# Patient Record
Sex: FEMALE | Race: OTHER | Employment: FULL TIME | URBAN - METROPOLITAN AREA
[De-identification: names, ages, dates, MRNs, and addresses within clinical notes are randomized per-mention and may not be internally consistent; named-entity substitution may affect disease eponyms.]

---

## 2019-07-10 ENCOUNTER — ROUTINE PRENATAL (OUTPATIENT)
Dept: OBGYN CLINIC | Age: 19
End: 2019-07-10

## 2019-07-10 VITALS — WEIGHT: 149 LBS | HEIGHT: 66 IN | BODY MASS INDEX: 23.95 KG/M2

## 2019-07-10 DIAGNOSIS — Z36.9 FIRST TRIMESTER SCREENING: Primary | ICD-10-CM

## 2019-07-10 DIAGNOSIS — Z3A.14 14 WEEKS GESTATION OF PREGNANCY: ICD-10-CM

## 2019-07-10 NOTE — PROGRESS NOTES
New OB, Ultrasound c/w dating of 14 weeks today w/an DESIREE of 1/7/2020. Otherwise interested in Quad screening- Will f/u in 4 weeks. Will order quad screening  For that time. Taking PNV. No complaints today.

## 2019-07-24 LAB
HCT, EXTERNAL: 37.8
HEPATITIS C AB,   EXT: NEGATIVE
HGB EVAL, EXTERNAL: NEGATIVE
HGB, EXTERNAL: 12.7
PLATELET CNT,   EXTERNAL: 288
RPR, EXTERNAL: NORMAL
RUBELLA, EXTERNAL: POSITIVE
URINALYSIS, EXTERNAL: NEGATIVE

## 2019-08-07 ENCOUNTER — ROUTINE PRENATAL (OUTPATIENT)
Dept: OBGYN CLINIC | Age: 19
End: 2019-08-07

## 2019-08-07 ENCOUNTER — HOSPITAL ENCOUNTER (OUTPATIENT)
Dept: LAB | Age: 19
Discharge: HOME | End: 2019-08-07
Payer: OTHER GOVERNMENT

## 2019-08-07 VITALS
WEIGHT: 150 LBS | DIASTOLIC BLOOD PRESSURE: 76 MMHG | HEIGHT: 66 IN | SYSTOLIC BLOOD PRESSURE: 117 MMHG | TEMPERATURE: 96.3 F | HEART RATE: 89 BPM | BODY MASS INDEX: 24.11 KG/M2

## 2019-08-07 DIAGNOSIS — Z3A.18 18 WEEKS GESTATION OF PREGNANCY: Primary | ICD-10-CM

## 2019-08-07 DIAGNOSIS — Z3A.18 18 WEEKS GESTATION OF PREGNANCY: ICD-10-CM

## 2019-08-07 PROCEDURE — 36415 COLL VENOUS BLD VENIPUNCTURE: CPT

## 2019-08-07 PROCEDURE — 82677 ASSAY OF ESTRIOL: CPT

## 2019-08-07 NOTE — PROGRESS NOTES
Geovany  presents today for   Chief Complaint   Patient presents with    Initial Prenatal Visit       Is someone accompanying this pt? FAMILY     /76   Pulse 89   Temp 96.3 °F (35.7 °C) (Oral)   Ht 5' 6\" (1.676 m)   Wt 150 lb (68 kg)   BMI 24.21 kg/m²     Depression Screening:  No flowsheet data found. Learning Assessment:  No flowsheet data found. Abuse Screening:  No flowsheet data found. Fall Risk  No flowsheet data found. This Visit Test  No results found for this or any previous visit. Health Maintenance reviewed and discussed and ordered per Provider. Health Maintenance Due   Topic Date Due    HPV Age 9Y-34Y (1 - Female 3-dose series) 07/23/2013    PAP AKA CERVICAL CYTOLOGY  07/23/2019    Influenza Age 9 to Adult  08/01/2019   . Coordination of Care:  1. Have you been to the ER, urgent care clinic since your last visit? Hospitalized since your last visit? NO    2. Have you seen or consulted any other health care providers outside of the [address] [place] [address] since your last visit? Include any pap smears or colon screening. NO    Patients last menstrual period was     Patient is at 25   Week (s).   The following has been completed/ordered:

## 2019-08-10 LAB
2ND TRIMESTER 4 SCREEN SERPL-IMP: NORMAL
2ND TRIMESTER 4 SCREEN SERPL-IMP: NORMAL
AFP ADJ MOM SERPL: 1.55
AFP SERPL-MCNC: 65.3 NG/ML
AGE AT DELIVERY: 21.4 YR
COMMENTS, 018014: NORMAL
FET TS 18 RISK FROM MAT AGE: NORMAL
FET TS 21 RISK FROM MAT AGE: 1141
GA METHOD: NORMAL
GA: 18 WEEKS
HCG ADJ MOM SERPL: 1.61
HCG SERPL-ACNC: NORMAL MIU/ML
IDDM PATIENT QL: NO
INHIBIN A ADJ MOM SERPL: 1.09
INHIBIN A SERPL-MCNC: 188.34 PG/ML
MULTIPLE PREGNANCY: NORMAL
NEURAL TUBE DEFECT RISK FETUS: 2393 %
RESULTS, 017389: NORMAL
TS 18 RISK FETUS: NORMAL
TS 21 RISK FETUS: 8693
U ESTRIOL ADJ MOM SERPL: 0.84
U ESTRIOL SERPL-MCNC: 1.08 NG/ML

## 2019-08-27 ENCOUNTER — ROUTINE PRENATAL (OUTPATIENT)
Dept: OBGYN CLINIC | Age: 19
End: 2019-08-27

## 2019-08-27 VITALS
SYSTOLIC BLOOD PRESSURE: 120 MMHG | WEIGHT: 153.4 LBS | TEMPERATURE: 97.9 F | BODY MASS INDEX: 24.65 KG/M2 | HEART RATE: 80 BPM | DIASTOLIC BLOOD PRESSURE: 77 MMHG | HEIGHT: 66 IN

## 2019-08-27 DIAGNOSIS — R10.2 PELVIC PAIN: Primary | ICD-10-CM

## 2019-08-27 DIAGNOSIS — Z3A.19 19 WEEKS GESTATION OF PREGNANCY: ICD-10-CM

## 2019-09-30 ENCOUNTER — ROUTINE PRENATAL (OUTPATIENT)
Dept: OBGYN CLINIC | Age: 19
End: 2019-09-30

## 2019-09-30 VITALS
DIASTOLIC BLOOD PRESSURE: 69 MMHG | RESPIRATION RATE: 18 BRPM | HEART RATE: 103 BPM | SYSTOLIC BLOOD PRESSURE: 125 MMHG | HEIGHT: 66 IN | WEIGHT: 157 LBS | BODY MASS INDEX: 25.23 KG/M2

## 2019-09-30 DIAGNOSIS — Z3A.26 26 WEEKS GESTATION OF PREGNANCY: Primary | ICD-10-CM

## 2019-10-02 NOTE — PATIENT INSTRUCTIONS

## 2019-10-07 ENCOUNTER — HOSPITAL ENCOUNTER (OUTPATIENT)
Dept: LAB | Age: 19
Discharge: HOME | End: 2019-10-07
Payer: OTHER GOVERNMENT

## 2019-10-07 ENCOUNTER — OFFICE VISIT (OUTPATIENT)
Dept: OBGYN CLINIC | Age: 19
End: 2019-10-07

## 2019-10-07 DIAGNOSIS — Z3A.26 26 WEEKS GESTATION OF PREGNANCY: Primary | ICD-10-CM

## 2019-10-07 DIAGNOSIS — Z3A.26 26 WEEKS GESTATION OF PREGNANCY: ICD-10-CM

## 2019-10-07 LAB
BASOPHILS # BLD: 0 K/UL (ref 0–0.1)
BASOPHILS NFR BLD: 0 % (ref 0–2)
DIFFERENTIAL METHOD BLD: ABNORMAL
EOSINOPHIL # BLD: 0.1 K/UL (ref 0–0.4)
EOSINOPHIL NFR BLD: 1 % (ref 0–5)
ERYTHROCYTE [DISTWIDTH] IN BLOOD BY AUTOMATED COUNT: 12.8 % (ref 11.6–14.5)
GLUCOSE 1H P 100 G GLC PO SERPL-MCNC: 123 MG/DL (ref 60–140)
HCT VFR BLD AUTO: 34 % (ref 35–45)
HGB BLD-MCNC: 11.1 G/DL (ref 12–16)
LYMPHOCYTES # BLD: 1.6 K/UL (ref 0.9–3.6)
LYMPHOCYTES NFR BLD: 20 % (ref 21–52)
MCH RBC QN AUTO: 32.4 PG (ref 24–34)
MCHC RBC AUTO-ENTMCNC: 32.6 G/DL (ref 31–37)
MCV RBC AUTO: 99.1 FL (ref 74–97)
MONOCYTES # BLD: 0.4 K/UL (ref 0.05–1.2)
MONOCYTES NFR BLD: 5 % (ref 3–10)
NEUTS SEG # BLD: 5.6 K/UL (ref 1.8–8)
NEUTS SEG NFR BLD: 74 % (ref 40–73)
PLATELET # BLD AUTO: 296 K/UL (ref 135–420)
PMV BLD AUTO: 9.5 FL (ref 9.2–11.8)
RBC # BLD AUTO: 3.43 M/UL (ref 4.2–5.3)
WBC # BLD AUTO: 7.7 K/UL (ref 4.6–13.2)

## 2019-10-07 PROCEDURE — 85025 COMPLETE CBC W/AUTO DIFF WBC: CPT

## 2019-10-07 PROCEDURE — 36415 COLL VENOUS BLD VENIPUNCTURE: CPT

## 2019-10-07 PROCEDURE — 82950 GLUCOSE TEST: CPT

## 2019-10-25 ENCOUNTER — ROUTINE PRENATAL (OUTPATIENT)
Dept: OBGYN CLINIC | Age: 19
End: 2019-10-25

## 2019-10-25 VITALS
DIASTOLIC BLOOD PRESSURE: 59 MMHG | HEART RATE: 88 BPM | HEIGHT: 66 IN | BODY MASS INDEX: 25.55 KG/M2 | SYSTOLIC BLOOD PRESSURE: 114 MMHG | WEIGHT: 159 LBS

## 2019-10-25 DIAGNOSIS — H65.01 NON-RECURRENT ACUTE SEROUS OTITIS MEDIA OF RIGHT EAR: ICD-10-CM

## 2019-10-25 DIAGNOSIS — H65.00 NON-RECURRENT ACUTE SEROUS OTITIS MEDIA, UNSPECIFIED LATERALITY: ICD-10-CM

## 2019-10-25 DIAGNOSIS — Z3A.29 29 WEEKS GESTATION OF PREGNANCY: Primary | ICD-10-CM

## 2019-10-25 DIAGNOSIS — B37.9 YEAST DETECTED: ICD-10-CM

## 2019-10-25 RX ORDER — AMOXICILLIN 500 MG/1
500 CAPSULE ORAL 2 TIMES DAILY
Qty: 14 CAP | Refills: 0 | Status: SHIPPED | OUTPATIENT
Start: 2019-10-25 | End: 2019-11-01

## 2019-10-25 RX ORDER — FLUCONAZOLE 150 MG/1
150 TABLET ORAL DAILY
Qty: 1 TAB | Refills: 0 | Status: SHIPPED | OUTPATIENT
Start: 2019-10-25 | End: 2019-10-26

## 2019-10-25 NOTE — PROGRESS NOTES
Chief Complaint   Patient presents with    Routine Prenatal Visit    Ear Pain     Room 13  Visit Vitals  /59   Pulse 88   Ht 5' 6\" (1.676 m)   Wt 159 lb (72.1 kg)   BMI 25.66 kg/m²     Results for orders placed or performed during the hospital encounter of 10/07/19   CBC WITH AUTOMATED DIFF   Result Value Ref Range    WBC 7.7 4.6 - 13.2 K/uL    RBC 3.43 (L) 4.20 - 5.30 M/uL    HGB 11.1 (L) 12.0 - 16.0 g/dL    HCT 34.0 (L) 35.0 - 45.0 %    MCV 99.1 (H) 74.0 - 97.0 FL    MCH 32.4 24.0 - 34.0 PG    MCHC 32.6 31.0 - 37.0 g/dL    RDW 12.8 11.6 - 14.5 %    PLATELET 400 734 - 917 K/uL    MPV 9.5 9.2 - 11.8 FL    NEUTROPHILS 74 (H) 40 - 73 %    LYMPHOCYTES 20 (L) 21 - 52 %    MONOCYTES 5 3 - 10 %    EOSINOPHILS 1 0 - 5 %    BASOPHILS 0 0 - 2 %    ABS. NEUTROPHILS 5.6 1.8 - 8.0 K/UL    ABS. LYMPHOCYTES 1.6 0.9 - 3.6 K/UL    ABS. MONOCYTES 0.4 0.05 - 1.2 K/UL    ABS. EOSINOPHILS 0.1 0.0 - 0.4 K/UL    ABS.  BASOPHILS 0.0 0.0 - 0.1 K/UL    DF AUTOMATED     GLUCOSE, GESTATIONAL 1 HR TOLERANCE   Result Value Ref Range    GESTATIONAL GLUC 1HR 123 60 - 140 MG/DL

## 2019-10-25 NOTE — PROGRESS NOTES
Passed татьяна, Will think about immunizations for next visit.  Has left ear infection- Will give Amoxicillin

## 2019-10-25 NOTE — PATIENT INSTRUCTIONS

## 2019-11-15 ENCOUNTER — ROUTINE PRENATAL (OUTPATIENT)
Dept: OBGYN CLINIC | Age: 19
End: 2019-11-15

## 2019-11-15 VITALS
HEART RATE: 93 BPM | DIASTOLIC BLOOD PRESSURE: 55 MMHG | SYSTOLIC BLOOD PRESSURE: 103 MMHG | RESPIRATION RATE: 18 BRPM | BODY MASS INDEX: 26.2 KG/M2 | HEIGHT: 66 IN | TEMPERATURE: 100.3 F | WEIGHT: 163 LBS

## 2019-11-15 DIAGNOSIS — Z3A.32 32 WEEKS GESTATION OF PREGNANCY: Primary | ICD-10-CM

## 2019-11-15 NOTE — PROGRESS NOTES
Lauren Hodges presents today for   Chief Complaint   Patient presents with    Routine Prenatal Visit       Is someone accompanying this pt? Partner   aNo flowsheet data found. Learning Assessment:  No flowsheet data found. Abuse Screening:  No flowsheet data found. Fall Risk  No flowsheet data found. This Visit Test  Results for orders placed or performed during the hospital encounter of 10/07/19   CBC WITH AUTOMATED DIFF   Result Value Ref Range    WBC 7.7 4.6 - 13.2 K/uL    RBC 3.43 (L) 4.20 - 5.30 M/uL    HGB 11.1 (L) 12.0 - 16.0 g/dL    HCT 34.0 (L) 35.0 - 45.0 %    MCV 99.1 (H) 74.0 - 97.0 FL    MCH 32.4 24.0 - 34.0 PG    MCHC 32.6 31.0 - 37.0 g/dL    RDW 12.8 11.6 - 14.5 %    PLATELET 329 019 - 160 K/uL    MPV 9.5 9.2 - 11.8 FL    NEUTROPHILS 74 (H) 40 - 73 %    LYMPHOCYTES 20 (L) 21 - 52 %    MONOCYTES 5 3 - 10 %    EOSINOPHILS 1 0 - 5 %    BASOPHILS 0 0 - 2 %    ABS. NEUTROPHILS 5.6 1.8 - 8.0 K/UL    ABS. LYMPHOCYTES 1.6 0.9 - 3.6 K/UL    ABS. MONOCYTES 0.4 0.05 - 1.2 K/UL    ABS. EOSINOPHILS 0.1 0.0 - 0.4 K/UL    ABS. BASOPHILS 0.0 0.0 - 0.1 K/UL    DF AUTOMATED     GLUCOSE, GESTATIONAL 1 HR TOLERANCE   Result Value Ref Range    GESTATIONAL GLUC 1HR 123 60 - 140 MG/DL       Health Maintenance reviewed and discussed and ordered per Provider. Health Maintenance Due   Topic Date Due    HPV Age 9Y-34Y (1 - Female 3-dose series) 07/23/2013    PAP AKA CERVICAL CYTOLOGY  07/23/2019    Influenza Age 9 to Adult  08/01/2019    OB 3RD TRIMESTER TDAP  10/09/2019   . Coordination of Care:  1. Have you been to the ER, urgent care clinic since your last visit? Hospitalized since your last visit? no    2. Have you seen or consulted any other health care providers outside of the [address] [place] [address] since your last visit? Include any pap smears or colon screening.  No

## 2019-11-29 NOTE — PROGRESS NOTES
Pt. Doing well, no complaints. +FM  Will rediscuss immunizations at next visit.    F/U 2 weeks routine ob

## 2019-12-04 ENCOUNTER — ROUTINE PRENATAL (OUTPATIENT)
Dept: OBGYN CLINIC | Age: 19
End: 2019-12-04

## 2019-12-04 ENCOUNTER — CLINICAL SUPPORT (OUTPATIENT)
Dept: OBGYN CLINIC | Age: 19
End: 2019-12-04

## 2019-12-04 VITALS — OXYGEN SATURATION: 99 % | RESPIRATION RATE: 20 BRPM | WEIGHT: 168.4 LBS | BODY MASS INDEX: 27.06 KG/M2 | HEIGHT: 66 IN

## 2019-12-04 DIAGNOSIS — Z3A.35 35 WEEKS GESTATION OF PREGNANCY: Primary | ICD-10-CM

## 2019-12-04 DIAGNOSIS — Z34.90 PREGNANCY, UNSPECIFIED GESTATIONAL AGE: Primary | ICD-10-CM

## 2019-12-04 NOTE — PROGRESS NOTES
Ms. Elisabeth Muñiz is a   35w0d with Estimated Date of Delivery: 20 who presents to the office for a routine prenatal visit. She denies contractions, leakage of fluid, or vaginal bleeding. She reports normal fetal movement. There were no vitals taken for this visit. A/P  22 y.o. with an IUP at 35w0d here for a routine prenatal visit. 1. Continue routine prenatal care  2. Strict 3rd trimester precautions given. Advised regarding leakage of fluid, contractions, and vaginal bleeding. Fetal kick count instructions given. 3. F/U in 1 weeks with 3rd trimester labs   4. Normal growth scan today. Result Narrative     [address]  [address] [facility] [address]  [place], [address] [place] [address]  [phone]  Patient Information    2019  Elisabeth Muñiz 2000     22 y.o. MR# [id number]  unknown    Indication:  Interval growth     Machine Used: [employer] HROG or [employer] 3 expert  Probe type: abdominal      Findings  Number of Fetuses: 1   Presentation: Vertex  Placental Location: Posterior   Cardiac Rate and Rhythm: 144 bpm   Amniotic Fluid Volume:  Largest pocket 5.1 cm  Cervical length:  3.0 cm    BPD: 8 cm (35w6d)  HC: 31 cm (35w4d)  AC: 30 cm (34w5d)  FL: 6 cm (35w6d)    GA by US: 35 Wks: 3 Days   Estimated Fetal Wt: 2628 g +/- 384 g, 53 percentile    Impression:  Estimated fetal weight and amniotic fluid appropriate for gestational age.     Sonographer: Marycarmen Nguyen   Physician: Dr. Ranjan Camarillo

## 2019-12-04 NOTE — PATIENT INSTRUCTIONS

## 2019-12-13 ENCOUNTER — ROUTINE PRENATAL (OUTPATIENT)
Dept: OBGYN CLINIC | Age: 19
End: 2019-12-13

## 2019-12-13 ENCOUNTER — HOSPITAL ENCOUNTER (OUTPATIENT)
Dept: LAB | Age: 19
Discharge: HOME | End: 2019-12-13
Payer: OTHER GOVERNMENT

## 2019-12-13 VITALS
HEIGHT: 66 IN | WEIGHT: 169 LBS | DIASTOLIC BLOOD PRESSURE: 71 MMHG | HEART RATE: 82 BPM | BODY MASS INDEX: 27.16 KG/M2 | TEMPERATURE: 98.1 F | SYSTOLIC BLOOD PRESSURE: 106 MMHG | RESPIRATION RATE: 18 BRPM

## 2019-12-13 DIAGNOSIS — Z3A.36 36 WEEKS GESTATION OF PREGNANCY: Primary | ICD-10-CM

## 2019-12-13 DIAGNOSIS — Z3A.36 36 WEEKS GESTATION OF PREGNANCY: ICD-10-CM

## 2019-12-13 PROCEDURE — 87491 CHLMYD TRACH DNA AMP PROBE: CPT

## 2019-12-16 LAB
C TRACH RRNA SPEC QL NAA+PROBE: NEGATIVE
N GONORRHOEA RRNA SPEC QL NAA+PROBE: NEGATIVE
SPECIMEN SOURCE: NORMAL
T VAGINALIS RRNA SPEC QL NAA+PROBE: NEGATIVE

## 2019-12-19 ENCOUNTER — ROUTINE PRENATAL (OUTPATIENT)
Dept: OBGYN CLINIC | Age: 19
End: 2019-12-19

## 2019-12-19 ENCOUNTER — HOSPITAL ENCOUNTER (OUTPATIENT)
Dept: LAB | Age: 19
Discharge: HOME | End: 2019-12-19
Payer: OTHER GOVERNMENT

## 2019-12-19 VITALS
BODY MASS INDEX: 27.16 KG/M2 | HEART RATE: 90 BPM | HEIGHT: 66 IN | DIASTOLIC BLOOD PRESSURE: 80 MMHG | SYSTOLIC BLOOD PRESSURE: 119 MMHG | RESPIRATION RATE: 20 BRPM | OXYGEN SATURATION: 99 % | WEIGHT: 169 LBS

## 2019-12-19 DIAGNOSIS — Z3A.37 37 WEEKS GESTATION OF PREGNANCY: Primary | ICD-10-CM

## 2019-12-19 DIAGNOSIS — Z3A.37 37 WEEKS GESTATION OF PREGNANCY: ICD-10-CM

## 2019-12-19 LAB
ABO + RH BLD: NORMAL
BLOOD GROUP ANTIBODIES SERPL: NORMAL
SPECIMEN EXP DATE BLD: NORMAL

## 2019-12-19 PROCEDURE — 86900 BLOOD TYPING SEROLOGIC ABO: CPT

## 2019-12-19 NOTE — PROGRESS NOTES
Ms. Lyudmila Villegas is a   37w1d with Estimated Date of Delivery: 20 who presents to the office for a routine prenatal visit. She denies contractions, leakage of fluid, or vaginal bleeding. She reports normal fetal movement. Visit Vitals  /80   Pulse 90   Resp 20   Ht 5' 6\" (1.676 m)   Wt 169 lb (76.7 kg)   SpO2 99%   BMI 27.28 kg/m²       A/P  22 y.o. with an IUP at 37w1d here for a routine prenatal visit. 1. Continue routine prenatal care  2. Strict 3rd trimester precautions given. Advised regarding leakage of fluid, contractions, and vaginal bleeding. Fetal kick count instructions given.   3. F/U in 1 weeks

## 2019-12-19 NOTE — PROGRESS NOTES
Eliudstas Eliot presents today for   Chief Complaint   Patient presents with    Routine Prenatal Visit       Is someone accompanying this pt? yes  atient does not have any concerns at this time. Patient no birth control at this time . Depression screening     Is the patient using any DME equipment during OV? no    Depression Screening:  No flowsheet data found. Learning Assessment:  No flowsheet data found. Abuse Screening:  No flowsheet data found. Fall Risk  No flowsheet data found. This Visit Test  Results for orders placed or performed during the hospital encounter of 12/13/19   CHLAMYDIA/NEISSERIA/TRICHOMONAS AMP   Result Value Ref Range    Chlamydia amplification NEGATIVE  NEG      N. gonorrhoeae amplification NEGATIVE  NEG      Trichomonas amplification NEGATIVE  NEG      Specimen Source ENDOCERVICAL         Health Maintenance reviewed and discussed and ordered per Provider. Health Maintenance Due   Topic Date Due    HPV Age 9Y-34Y (1 - Female 2-dose series) 07/23/2009    PAP AKA CERVICAL CYTOLOGY  07/23/2019   . Coordination of Care:  1. Have you been to the ER, urgent care clinic since your last visit? Hospitalized since your last visit? no    2. Have you seen or consulted any other health care providers outside of the [address] [place] [address] since your last visit? Include any pap smears or colon screening.  no

## 2019-12-27 ENCOUNTER — ROUTINE PRENATAL (OUTPATIENT)
Dept: OBGYN CLINIC | Age: 19
End: 2019-12-27

## 2019-12-27 VITALS
BODY MASS INDEX: 27.9 KG/M2 | RESPIRATION RATE: 20 BRPM | HEART RATE: 86 BPM | WEIGHT: 173.6 LBS | OXYGEN SATURATION: 99 % | HEIGHT: 66 IN | SYSTOLIC BLOOD PRESSURE: 105 MMHG | DIASTOLIC BLOOD PRESSURE: 75 MMHG

## 2019-12-27 DIAGNOSIS — Z3A.38 38 WEEKS GESTATION OF PREGNANCY: Primary | ICD-10-CM

## 2019-12-27 LAB
BILIRUB UR QL STRIP: NEGATIVE
GLUCOSE UR-MCNC: NEGATIVE MG/DL
KETONES P FAST UR STRIP-MCNC: NEGATIVE MG/DL
PH UR STRIP: 7 [PH] (ref 4.6–8)
PROT UR QL STRIP: NEGATIVE
SP GR UR STRIP: 1.02 (ref 1–1.03)
UA UROBILINOGEN AMB POC: NORMAL (ref 0.2–1)
URINALYSIS CLARITY POC: CLEAR
URINALYSIS COLOR POC: YELLOW
URINE BLOOD POC: NEGATIVE
URINE LEUKOCYTES POC: NORMAL
URINE NITRITES POC: NEGATIVE

## 2019-12-27 NOTE — PROGRESS NOTES
38. 2 WGA- Routine ob; +FM  Plan:  1) Decreased FH- needs growth scan before provider visit.   2) F/U one week routine ob

## 2019-12-27 NOTE — PROGRESS NOTES
Kenny Hein presents today for   Chief Complaint   Patient presents with    Routine Prenatal Visit       Is someone accompanying this pt? yes  atient does not have any concerns at this time. Patient no birth control at this time . Depression screening     Is the patient using any DME equipment during OV? no    Depression Screening:  No flowsheet data found. Learning Assessment:  No flowsheet data found. Abuse Screening:  No flowsheet data found. Fall Risk  No flowsheet data found. This Visit Test  Results for orders placed or performed during the hospital encounter of 12/19/19   TYPE & SCREEN   Result Value Ref Range    Crossmatch Expiration 12/22/2019     ABO/Rh(D) A POSITIVE     Antibody screen NEG        Health Maintenance reviewed and discussed and ordered per Provider. Health Maintenance Due   Topic Date Due    HPV Age 9Y-34Y (1 - Female 2-dose series) 07/23/2009    PAP AKA CERVICAL CYTOLOGY  07/23/2019   . Coordination of Care:  1. Have you been to the ER, urgent care clinic since your last visit? Hospitalized since your last visit? no    2. Have you seen or consulted any other health care providers outside of the [address] [place] [address] since your last visit? Include any pap smears or colon screening.  no

## 2019-12-27 NOTE — PATIENT INSTRUCTIONS
Week 39 of Your Pregnancy: Care Instructions  Your Care Instructions    During these final weeks, you may feel anxious to see your new baby. [place] babies often look different from what you see in pictures or movies. Right after birth, their heads may have a strange shape. Their eyes may be puffy. And their genitals may be swollen. They may also have very dry skin, or red marks on the eyelids, nose, or neck. Still, most parents think their babies are beautiful. Follow-up care is a key part of your treatment and safety. Be sure to make and go to all appointments, and call your doctor if you are having problems. It's also a good idea to know your test results and keep a list of the medicines you take. How can you care for yourself at home? Prepare to breastfeed  · If you are breastfeeding, continue to eat healthy foods. · If your health care provider recommends it, keep taking your prenatal vitamins. · Talk to your doctor before you take any medicine or supplement. That's because some medicines can affect your breast milk. · You can help prevent sore nipples if you feed your baby in the correct position. Nurses will help you learn to do this. · Your  will need to be fed about every 1 to 3 hours. Choose the right birth control after your baby is born  · Women who are breastfeeding can still get pregnant. Use birth control if you don't want to get pregnant. · Intrauterine devices (IUDs) are very effective at preventing pregnancy and can provide birth control for 3 to 10 years, depending on the type. If you talk with your doctor before having your baby, the IUD can be placed right after you give birth. If you decide you want to get pregnant later, you can have it removed. They are safe to use while you are breastfeeding. · A hormonal implant is also very effective at preventing pregnancy. It is placed under the skin of the arm. This can be done right after you give birth.  It releases the hormone progestin and prevents pregnancy for about 3 years. This can also be removed by a doctor at any time. It is safe to use while you are breastfeeding. · Depo-Provera can be used while you are breastfeeding. It is a shot you get every 3 months. · Birth control pills work well. But you need a different kind of pill for the first few weeks after giving birth. And when you start taking these pills, you need to make sure to use another type of birth control for 7 days after you start your pack. · Diaphragms, cervical caps, and condoms with spermicide work less well after birth. If you have a diaphragm or cervical cap, talk to your doctor to see if you need a different size. · Tubal ligation (tying your tubes) and vasectomy are both permanent. These are good options if you are sure you are done having children. Where can you learn more? Go to http://doreen-ok.info/. Enter T959 in the search box to learn more about \"Week 39 of Your Pregnancy: Care Instructions. \"  Current as of: May 29, 2019  Content Version: 12.2  © 2693-9970 [employer]. Care instructions adapted under license by [employer] (which disclaims liability or warranty for this information). If you have questions about a medical condition or this instruction, always ask your healthcare professional. [place] [address] any warranty or liability for your use of this information. Week 38 of Your Pregnancy: Care Instructions  Your Care Instructions    Believe it or not, your baby is almost here. You may have ideas about your baby's personality because of how much he or she moves. Or you may have noticed how he or she responds to sounds, warmth, cold, and light. You may even know what kind of music your baby likes. By now, you have a better idea of what to expect during delivery. You may have talked about your birth preferences with your doctor.  But even if you want a vaginal birth, it is a good idea to learn about  births.  birth means that your baby is born through a cut (incision) in your lower belly. It is sometimes the best choice for the health of the baby and the mother. This care sheet can help you understand  births. It also gives you information about what to expect after your baby is born. And it helps you understand more about postpartum depression. Follow-up care is a key part of your treatment and safety. Be sure to make and go to all appointments, and call your doctor if you are having problems. It's also a good idea to know your test results and keep a list of the medicines you take. How can you care for yourself at home? Learn about  birth  · Most C-sections are unplanned. They are done because of problems that occur during labor. These problems might include:  ? Labor that slows or stops. ? High blood pressure or other problems for the mother. ? Signs of distress in the baby. These signs may include a very fast or slow heart rate. · Although most mothers and babies do well after , it is major surgery. It has more risks than a vaginal delivery. · In some cases, a planned  may be safer than a vaginal delivery. This may be the case if:  ? The mother has a health problem, such as a heart condition. ? The baby isn't in a head-down position for delivery. This is called a breech position. ? The uterus has scars from past surgeries. This could increase the chance of a tear in the uterus. ? There is a problem with the placenta. ? The mother has an infection, such as genital herpes, that could be spread to the baby. ? The mother is having twins or more. ? The baby weighs 9 to 10 pounds or more. · Because of the risks of , planned C-sections generally should be done only for medical reasons. And a planned  should be done at 39 weeks or later unless there is a medical reason to do it sooner.   Know what to expect after delivery, and plan for the first few weeks at home  · You, your baby, and your partner or  will get identification bands. Only people with matching bands can  the baby from the nursery. · You will learn how to feed, diaper, and bathe your baby. And you will learn how to care for the umbilical cord stump. If your baby will be circumcised, you will also learn how to care for that. · Ask people to wait to visit you until you are at home. And ask them to wash their hands before they touch your baby. · Make sure you have another adult in your home for at least 2 or 3 days after the birth. · During the first 2 weeks, limit when friends and family can visit. · Do not allow visitors who have colds or infections. Make sure all visitors are up to date with their vaccinations. Never let anyone smoke around your baby. · Try to nap when the baby naps. Be aware of postpartum depression  · \"Baby blues\" are common for the first 1 to 2 weeks after birth. You may cry or feel sad or irritable for no reason. · For some women, these feelings last longer and are more intense. This is called postpartum depression. · If your symptoms last for more than a few weeks or you feel very depressed, ask your doctor for help. · Postpartum depression can be treated. Support groups and counseling can help. Sometimes medicine can also help. Where can you learn more? Go to http://doreen-ok.info/. Enter B044 in the search box to learn more about \"Week 38 of Your Pregnancy: Care Instructions. \"  Current as of: May 29, 2019  Content Version: 12.2  © 3082-1433 [employer]. Care instructions adapted under license by [employer] (which disclaims liability or warranty for this information).  If you have questions about a medical condition or this instruction, always ask your healthcare professional. [address] any warranty or liability for your use of this information.

## 2020-01-06 ENCOUNTER — HOSPITAL ENCOUNTER (INPATIENT)
Age: 20
LOS: 3 days | Discharge: HOME | End: 2020-01-09
Attending: OBSTETRICS & GYNECOLOGY | Admitting: OBSTETRICS & GYNECOLOGY
Payer: OTHER GOVERNMENT

## 2020-01-06 ENCOUNTER — ROUTINE PRENATAL (OUTPATIENT)
Dept: OBGYN CLINIC | Age: 20
End: 2020-01-06

## 2020-01-06 ENCOUNTER — HOSPITAL ENCOUNTER (OUTPATIENT)
Dept: LAB | Age: 20
Discharge: HOME | End: 2020-01-06
Payer: OTHER GOVERNMENT

## 2020-01-06 VITALS
SYSTOLIC BLOOD PRESSURE: 112 MMHG | RESPIRATION RATE: 20 BRPM | WEIGHT: 173.2 LBS | HEART RATE: 86 BPM | BODY MASS INDEX: 27.83 KG/M2 | OXYGEN SATURATION: 99 % | HEIGHT: 66 IN | DIASTOLIC BLOOD PRESSURE: 78 MMHG

## 2020-01-06 DIAGNOSIS — Z3A.39 39 WEEKS GESTATION OF PREGNANCY: Primary | ICD-10-CM

## 2020-01-06 DIAGNOSIS — Z3A.39 39 WEEKS GESTATION OF PREGNANCY: ICD-10-CM

## 2020-01-06 PROBLEM — O26.90 ABNORMAL PREGNANCY: Status: ACTIVE | Noted: 2020-01-06

## 2020-01-06 LAB
ABO + RH BLD: NORMAL
BLOOD GROUP ANTIBODIES SERPL: NORMAL
ERYTHROCYTE [DISTWIDTH] IN BLOOD BY AUTOMATED COUNT: 12.1 % (ref 11.6–14.5)
HCT VFR BLD AUTO: 35.5 % (ref 35–45)
HGB BLD-MCNC: 11.6 G/DL (ref 12–16)
MCH RBC QN AUTO: 31.6 PG (ref 24–34)
MCHC RBC AUTO-ENTMCNC: 32.7 G/DL (ref 31–37)
MCV RBC AUTO: 96.7 FL (ref 74–97)
PLATELET # BLD AUTO: 305 K/UL (ref 135–420)
PMV BLD AUTO: 9.9 FL (ref 9.2–11.8)
RBC # BLD AUTO: 3.67 M/UL (ref 4.2–5.3)
SPECIMEN EXP DATE BLD: NORMAL
WBC # BLD AUTO: 10.3 K/UL (ref 4.6–13.2)

## 2020-01-06 PROCEDURE — 85027 COMPLETE CBC AUTOMATED: CPT

## 2020-01-06 PROCEDURE — 65270000029 HC RM PRIVATE

## 2020-01-06 PROCEDURE — 87081 CULTURE SCREEN ONLY: CPT

## 2020-01-06 PROCEDURE — 86900 BLOOD TYPING SEROLOGIC ABO: CPT

## 2020-01-06 NOTE — PROGRESS NOTES
Chuy Mcgrath presents today for   Chief Complaint   Patient presents with    Routine Prenatal Visit     c/o dizzy spells       Is someone accompanying this pt? yes  atient does not have any concerns at this time. Patient no birth control at this time . Depression screening     Is the patient using any DME equipment during OV? no    Depression Screening:  No flowsheet data found. Learning Assessment:  No flowsheet data found. Abuse Screening:  No flowsheet data found. Fall Risk  No flowsheet data found. This Visit Test  Results for orders placed or performed in visit on 12/27/19   AMB POC URINALYSIS DIP STICK AUTO W/ MICRO   Result Value Ref Range    Color (UA POC) Yellow     Clarity (UA POC) Clear     Glucose (UA POC) Negative Negative    Bilirubin (UA POC) Negative Negative    Ketones (UA POC) Negative Negative    Specific gravity (UA POC) 1.020 1.001 - 1.035    Blood (UA POC) Negative Negative    pH (UA POC) 7.0 4.6 - 8.0    Protein (UA POC) Negative Negative    Urobilinogen (UA POC) 0.2 mg/dL 0.2 - 1    Nitrites (UA POC) Negative Negative    Leukocyte esterase (UA POC) Trace Negative       Health Maintenance reviewed and discussed and ordered per Provider. Health Maintenance Due   Topic Date Due    HPV Age 9Y-34Y (1 - Female 2-dose series) 07/23/2009    PAP AKA CERVICAL CYTOLOGY  07/23/2019   . Coordination of Care:  1. Have you been to the ER, urgent care clinic since your last visit? Hospitalized since your last visit? no    2. Have you seen or consulted any other health care providers outside of the [address] [place] [address] since your last visit? Include any pap smears or colon screening.  No  Visit Vitals  /78   Pulse 86   Resp 20   Ht 5' 6\" (1.676 m)   Wt 173 lb 3.2 oz (78.6 kg)   SpO2 99%   BMI 27.96 kg/m²

## 2020-01-06 NOTE — PROGRESS NOTES
Growth ultrasound at 47th percentile. Plan:  39.6 WGA- gbs recollected today because we cant find other sample  1) labor precautions reviewed.   2) f/u 1 week routine ob

## 2020-01-07 ENCOUNTER — ANESTHESIA (OUTPATIENT)
Dept: LABOR AND DELIVERY | Age: 20
End: 2020-01-07
Payer: OTHER GOVERNMENT

## 2020-01-07 ENCOUNTER — ANESTHESIA EVENT (OUTPATIENT)
Dept: LABOR AND DELIVERY | Age: 20
End: 2020-01-07
Payer: OTHER GOVERNMENT

## 2020-01-07 PROCEDURE — 00HU33Z INSERTION OF INFUSION DEVICE INTO SPINAL CANAL, PERCUTANEOUS APPROACH: ICD-10-PCS | Performed by: NURSE ANESTHETIST, CERTIFIED REGISTERED

## 2020-01-07 PROCEDURE — 75410000002 HC LABOR FEE PER 1 HR

## 2020-01-07 PROCEDURE — 74011000250 HC RX REV CODE- 250: Performed by: NURSE ANESTHETIST, CERTIFIED REGISTERED

## 2020-01-07 PROCEDURE — 77030007879 HC KT SPN EPDRL TELE -B: Performed by: NURSE ANESTHETIST, CERTIFIED REGISTERED

## 2020-01-07 PROCEDURE — 77010026065 HC OXYGEN MINIMUM MEDICAL AIR

## 2020-01-07 PROCEDURE — 77030040830 HC CATH URETH FOL MDII -A

## 2020-01-07 PROCEDURE — 75410000003 HC RECOV DEL/VAG/CSECN EA 0.5 HR

## 2020-01-07 PROCEDURE — 65270000029 HC RM PRIVATE

## 2020-01-07 PROCEDURE — 74011250637 HC RX REV CODE- 250/637: Performed by: OBSTETRICS & GYNECOLOGY

## 2020-01-07 PROCEDURE — 74011250636 HC RX REV CODE- 250/636: Performed by: OBSTETRICS & GYNECOLOGY

## 2020-01-07 PROCEDURE — 76060000078 HC EPIDURAL ANESTHESIA

## 2020-01-07 PROCEDURE — 0KQM0ZZ REPAIR PERINEUM MUSCLE, OPEN APPROACH: ICD-10-PCS | Performed by: OBSTETRICS & GYNECOLOGY

## 2020-01-07 PROCEDURE — 75410000000 HC DELIVERY VAGINAL/SINGLE

## 2020-01-07 RX ORDER — OXYCODONE AND ACETAMINOPHEN 5; 325 MG/1; MG/1
2 TABLET ORAL
Status: DISCONTINUED | OUTPATIENT
Start: 2020-01-07 | End: 2020-01-09 | Stop reason: HOSPADM

## 2020-01-07 RX ORDER — SODIUM CHLORIDE 0.9 % (FLUSH) 0.9 %
5-40 SYRINGE (ML) INJECTION AS NEEDED
Status: DISCONTINUED | OUTPATIENT
Start: 2020-01-07 | End: 2020-01-07 | Stop reason: HOSPADM

## 2020-01-07 RX ORDER — OXYTOCIN/RINGER'S LACTATE 20/1000 ML
999 PLASTIC BAG, INJECTION (ML) INTRAVENOUS ONCE
Status: COMPLETED | OUTPATIENT
Start: 2020-01-07 | End: 2020-01-07

## 2020-01-07 RX ORDER — SODIUM CHLORIDE, SODIUM LACTATE, POTASSIUM CHLORIDE, CALCIUM CHLORIDE 600; 310; 30; 20 MG/100ML; MG/100ML; MG/100ML; MG/100ML
125 INJECTION, SOLUTION INTRAVENOUS CONTINUOUS
Status: DISCONTINUED | OUTPATIENT
Start: 2020-01-07 | End: 2020-01-09 | Stop reason: HOSPADM

## 2020-01-07 RX ORDER — MISOPROSTOL 200 UG/1
800 TABLET ORAL ONCE
Status: DISPENSED | OUTPATIENT
Start: 2020-01-07 | End: 2020-01-07

## 2020-01-07 RX ORDER — SODIUM CHLORIDE 0.9 % (FLUSH) 0.9 %
5-40 SYRINGE (ML) INJECTION EVERY 8 HOURS
Status: DISCONTINUED | OUTPATIENT
Start: 2020-01-07 | End: 2020-01-07 | Stop reason: HOSPADM

## 2020-01-07 RX ORDER — LIDOCAINE HYDROCHLORIDE 10 MG/ML
20 INJECTION, SOLUTION EPIDURAL; INFILTRATION; INTRACAUDAL; PERINEURAL AS NEEDED
Status: DISCONTINUED | OUTPATIENT
Start: 2020-01-07 | End: 2020-01-07 | Stop reason: HOSPADM

## 2020-01-07 RX ORDER — SODIUM CHLORIDE 0.9 % (FLUSH) 0.9 %
5-40 SYRINGE (ML) INJECTION AS NEEDED
Status: DISCONTINUED | OUTPATIENT
Start: 2020-01-07 | End: 2020-01-09 | Stop reason: HOSPADM

## 2020-01-07 RX ORDER — SODIUM CHLORIDE, SODIUM LACTATE, POTASSIUM CHLORIDE, CALCIUM CHLORIDE 600; 310; 30; 20 MG/100ML; MG/100ML; MG/100ML; MG/100ML
125 INJECTION, SOLUTION INTRAVENOUS CONTINUOUS
Status: DISCONTINUED | OUTPATIENT
Start: 2020-01-07 | End: 2020-01-07 | Stop reason: HOSPADM

## 2020-01-07 RX ORDER — LIDOCAINE HYDROCHLORIDE AND EPINEPHRINE 15; 5 MG/ML; UG/ML
INJECTION, SOLUTION EPIDURAL AS NEEDED
Status: DISCONTINUED | OUTPATIENT
Start: 2020-01-07 | End: 2020-01-07 | Stop reason: HOSPADM

## 2020-01-07 RX ORDER — NALBUPHINE HYDROCHLORIDE 10 MG/ML
10 INJECTION, SOLUTION INTRAMUSCULAR; INTRAVENOUS; SUBCUTANEOUS
Status: DISCONTINUED | OUTPATIENT
Start: 2020-01-07 | End: 2020-01-07 | Stop reason: HOSPADM

## 2020-01-07 RX ORDER — AMOXICILLIN 250 MG
1 CAPSULE ORAL
Status: DISCONTINUED | OUTPATIENT
Start: 2020-01-07 | End: 2020-01-09 | Stop reason: HOSPADM

## 2020-01-07 RX ORDER — IBUPROFEN 400 MG/1
800 TABLET ORAL
Status: DISCONTINUED | OUTPATIENT
Start: 2020-01-07 | End: 2020-01-09 | Stop reason: HOSPADM

## 2020-01-07 RX ORDER — PROMETHAZINE HYDROCHLORIDE 25 MG/ML
25 INJECTION, SOLUTION INTRAMUSCULAR; INTRAVENOUS
Status: DISCONTINUED | OUTPATIENT
Start: 2020-01-07 | End: 2020-01-09 | Stop reason: HOSPADM

## 2020-01-07 RX ORDER — ACETAMINOPHEN 325 MG/1
650 TABLET ORAL
Status: DISCONTINUED | OUTPATIENT
Start: 2020-01-07 | End: 2020-01-09 | Stop reason: HOSPADM

## 2020-01-07 RX ORDER — NALOXONE HYDROCHLORIDE 0.4 MG/ML
0.2 INJECTION, SOLUTION INTRAMUSCULAR; INTRAVENOUS; SUBCUTANEOUS AS NEEDED
Status: DISCONTINUED | OUTPATIENT
Start: 2020-01-07 | End: 2020-01-07 | Stop reason: HOSPADM

## 2020-01-07 RX ORDER — DIPHENHYDRAMINE HYDROCHLORIDE 50 MG/ML
25 INJECTION, SOLUTION INTRAMUSCULAR; INTRAVENOUS
Status: DISCONTINUED | OUTPATIENT
Start: 2020-01-07 | End: 2020-01-07 | Stop reason: HOSPADM

## 2020-01-07 RX ORDER — BUPIVACAINE HYDROCHLORIDE 2.5 MG/ML
INJECTION, SOLUTION EPIDURAL; INFILTRATION; INTRACAUDAL AS NEEDED
Status: DISCONTINUED | OUTPATIENT
Start: 2020-01-07 | End: 2020-01-07 | Stop reason: HOSPADM

## 2020-01-07 RX ORDER — MINERAL OIL
30 OIL (ML) ORAL AS NEEDED
Status: DISCONTINUED | OUTPATIENT
Start: 2020-01-07 | End: 2020-01-07 | Stop reason: HOSPADM

## 2020-01-07 RX ORDER — HYDROMORPHONE HYDROCHLORIDE 1 MG/ML
1 INJECTION, SOLUTION INTRAMUSCULAR; INTRAVENOUS; SUBCUTANEOUS
Status: DISCONTINUED | OUTPATIENT
Start: 2020-01-07 | End: 2020-01-07 | Stop reason: HOSPADM

## 2020-01-07 RX ORDER — SODIUM CHLORIDE 0.9 % (FLUSH) 0.9 %
5-40 SYRINGE (ML) INJECTION EVERY 8 HOURS
Status: DISCONTINUED | OUTPATIENT
Start: 2020-01-07 | End: 2020-01-09 | Stop reason: HOSPADM

## 2020-01-07 RX ORDER — ZOLPIDEM TARTRATE 5 MG/1
5 TABLET ORAL
Status: DISCONTINUED | OUTPATIENT
Start: 2020-01-07 | End: 2020-01-09 | Stop reason: HOSPADM

## 2020-01-07 RX ORDER — METHYLERGONOVINE MALEATE 0.2 MG/ML
0.2 INJECTION INTRAVENOUS AS NEEDED
Status: DISCONTINUED | OUTPATIENT
Start: 2020-01-07 | End: 2020-01-07 | Stop reason: HOSPADM

## 2020-01-07 RX ORDER — OXYTOCIN/0.9 % SODIUM CHLORIDE 30/500 ML
2 PLASTIC BAG, INJECTION (ML) INTRAVENOUS
Status: DISCONTINUED | OUTPATIENT
Start: 2020-01-07 | End: 2020-01-09 | Stop reason: HOSPADM

## 2020-01-07 RX ORDER — OXYTOCIN/RINGER'S LACTATE 20/1000 ML
2 PLASTIC BAG, INJECTION (ML) INTRAVENOUS
Status: DISCONTINUED | OUTPATIENT
Start: 2020-01-07 | End: 2020-01-09 | Stop reason: HOSPADM

## 2020-01-07 RX ORDER — IBUPROFEN 400 MG/1
400 TABLET ORAL
Status: DISCONTINUED | OUTPATIENT
Start: 2020-01-07 | End: 2020-01-07 | Stop reason: HOSPADM

## 2020-01-07 RX ORDER — PHENYLEPHRINE HCL IN 0.9% NACL 1 MG/10 ML
100 SYRINGE (ML) INTRAVENOUS AS NEEDED
Status: DISCONTINUED | OUTPATIENT
Start: 2020-01-07 | End: 2020-01-07 | Stop reason: HOSPADM

## 2020-01-07 RX ORDER — SALICYLIC ACID
30 POWDER (GRAM) MISCELLANEOUS ONCE
Status: DISPENSED | OUTPATIENT
Start: 2020-01-07 | End: 2020-01-07

## 2020-01-07 RX ORDER — BUTORPHANOL TARTRATE 1 MG/ML
2 INJECTION INTRAMUSCULAR; INTRAVENOUS
Status: DISCONTINUED | OUTPATIENT
Start: 2020-01-07 | End: 2020-01-07 | Stop reason: HOSPADM

## 2020-01-07 RX ORDER — TERBUTALINE SULFATE 1 MG/ML
0.25 INJECTION SUBCUTANEOUS
Status: DISCONTINUED | OUTPATIENT
Start: 2020-01-07 | End: 2020-01-07 | Stop reason: HOSPADM

## 2020-01-07 RX ADMIN — SODIUM CHLORIDE, SODIUM LACTATE, POTASSIUM CHLORIDE, AND CALCIUM CHLORIDE 125 ML/HR: 600; 310; 30; 20 INJECTION, SOLUTION INTRAVENOUS at 05:55

## 2020-01-07 RX ADMIN — Medication 10 ML/HR: at 03:53

## 2020-01-07 RX ADMIN — SODIUM CHLORIDE, SODIUM LACTATE, POTASSIUM CHLORIDE, AND CALCIUM CHLORIDE 125 ML/HR: 600; 310; 30; 20 INJECTION, SOLUTION INTRAVENOUS at 08:30

## 2020-01-07 RX ADMIN — BUPIVACAINE HYDROCHLORIDE 5 ML: 2.5 INJECTION, SOLUTION EPIDURAL; INFILTRATION; INTRACAUDAL; PERINEURAL at 03:47

## 2020-01-07 RX ADMIN — LIDOCAINE HYDROCHLORIDE,EPINEPHRINE BITARTRATE 3 ML: 15; .005 INJECTION, SOLUTION EPIDURAL; INFILTRATION; INTRACAUDAL; PERINEURAL at 03:44

## 2020-01-07 RX ADMIN — Medication 10 ML/HR: at 03:46

## 2020-01-07 RX ADMIN — SODIUM CHLORIDE, SODIUM LACTATE, POTASSIUM CHLORIDE, AND CALCIUM CHLORIDE 1000 ML: 600; 310; 30; 20 INJECTION, SOLUTION INTRAVENOUS at 03:26

## 2020-01-07 RX ADMIN — OXYTOCIN-SODIUM CHLORIDE 0.9% IV SOLN 30 UNIT/500ML 4 MILLI-UNITS/MIN: 30-0.9/5 SOLUTION at 05:50

## 2020-01-07 RX ADMIN — Medication 19980 MILLI-UNITS/HR: at 11:54

## 2020-01-07 RX ADMIN — BUPIVACAINE HYDROCHLORIDE 5 ML: 2.5 INJECTION, SOLUTION EPIDURAL; INFILTRATION; INTRACAUDAL; PERINEURAL at 03:50

## 2020-01-07 RX ADMIN — SODIUM CHLORIDE, SODIUM LACTATE, POTASSIUM CHLORIDE, AND CALCIUM CHLORIDE 125 ML/HR: 600; 310; 30; 20 INJECTION, SOLUTION INTRAVENOUS at 03:26

## 2020-01-07 RX ADMIN — OXYTOCIN-SODIUM CHLORIDE 0.9% IV SOLN 30 UNIT/500ML 2 MILLI-UNITS/MIN: 30-0.9/5 SOLUTION at 04:57

## 2020-01-07 RX ADMIN — IBUPROFEN 800 MG: 400 TABLET, FILM COATED ORAL at 19:26

## 2020-01-07 NOTE — PROGRESS NOTES
Pt presents to L&D with c/0 SROM at 1700. Pt reports clear fluid. Pt denies vaginal bleeding and reports fetal movement.  @ 39w5d. Dr. Magalie Patten notified. Amnisure positive. Pt admitted. Labs drawn and sent. IV started. Consents signed. White board updated  2230 Pt up to ambulate  0130 Birthing ball at bedside. Instructed pt on use. Return demonstration appropriate. 0300 Pt requesting Epidural. Anesthesia paged  0320 Time out for Epidural  0345 Catheter placed  0345 Bolus dose  0348 Continous infusion started  0350 Epidural procedure complete  0400 Rojas placed  0457 Pitocin started per protocol  0700 SBAR report given to oncoming shift.

## 2020-01-07 NOTE — ANESTHESIA PROCEDURE NOTES
Epidural Block    Start time: 1/7/2020 3:31 AM  End time: 1/7/2020 3:54 AM  Performed by: Joi Price CRNA  Authorized by: Joi Price CRNA     Pre-Procedure  Indication: labor epidural    Preanesthetic Checklist: patient identified, risks and benefits discussed, anesthesia consent, site marked, patient being monitored, timeout performed and anesthesia consent    Timeout Time: 03:31        Epidural:   Patient position:  Seated  Prep region:  Lumbar  Prep: Betadine    Location:  L3-4    Needle and Epidural Catheter:   Needle Type:  Tuohy  Needle Gauge:  18 G  Injection Technique:  Loss of resistance using saline  Attempts:  2  Catheter Size:  20 G  Catheter at Skin Depth (cm):  14  Depth in Epidural Space (cm):  7  Events: no blood with aspiration, no cerebrospinal fluid with aspiration, no paresthesia and negative aspiration test    Test Dose:  Negative    Assessment:   Catheter Secured:  Tegaderm  Insertion:  Uncomplicated  Patient tolerance:  Patient tolerated the procedure well with no immediate complications

## 2020-01-07 NOTE — ANESTHESIA PREPROCEDURE EVALUATION
Relevant Problems   No relevant active problems       Anesthetic History   No history of anesthetic complications            Review of Systems / Medical History  Patient summary reviewed, nursing notes reviewed and pertinent labs reviewed    Pulmonary  Within defined limits                 Neuro/Psych   Within defined limits           Cardiovascular  Within defined limits                     GI/Hepatic/Renal  Within defined limits              Endo/Other  Within defined limits           Other Findings              Physical Exam    Airway  Mallampati: II  TM Distance: 4 - 6 cm  Neck ROM: normal range of motion        Cardiovascular  Regular rate and rhythm,  S1 and S2 normal,  no murmur, click, rub, or gallop             Dental  No notable dental hx       Pulmonary  Breath sounds clear to auscultation               Abdominal  GI exam deferred       Other Findings            Anesthetic Plan    ASA: 2  Anesthesia type: epidural            Anesthetic plan and risks discussed with: Patient and Family

## 2020-01-07 NOTE — L&D DELIVERY NOTE
Delivery Note    Obstetrician:  Sulema Avila MD    Pre-Delivery Diagnosis: Term pregnancy    Post-Delivery Diagnosis: Living  infant(s)    Intrapartum Event: terminal decel    Procedure: Spontaneous vaginal delivery    Epidural: YES    Estimated Blood Loss: 250cc    Episiotomy: n/a    Laceration(s):  2nd degree    Laceration(s) repair: YES    Presentation: Cephalic    Fetal Description: mercado    Fetal Position: Occiput Anterior    Birth Weight: pending    Apgar - One Minute: 8    Apgar - Five Minutes: 9    Umbilical Cord: 3 vessels present    Specimens: n/a           Complications:  none           Cord Blood Results: n/a  This patient has no babies on file.   Prenatal Labs:     Lab Results   Component Value Date/Time    ABO/Rh(D) A POSITIVE 2020 08:02 PM    Rubella, External POSITIVE  2019    RPR, External  NON REATCIVE  2019        Attending Attestation: I was present and scrubbed for the entire procedure    Signed By:  Sulema Avila MD     2020

## 2020-01-07 NOTE — ROUTINE PROCESS
Bedside and Verbal shift change report given to Mohsen Tam RNC (oncoming nurse) by LILLIAM Turner RN (offgoing nurse). Report included the following information SBAR, Kardex, Procedure Summary, Intake/Output, MAR, Recent Results and Med Rec Status. Assumed care of pt. Introduced myself and updated whiteboard, explained nursing plan of care for my shift. Pt alert and oriented; assessment and vitals completed, WNL. Pt denies headache, visual disturbances and epigastric pain. Pt has no c/o pain that she rates 0/10, epidural in progress. Pt verbalized agreement to plan. Questions answered. 65 Dr. Andres Che was made aware of the progress.

## 2020-01-07 NOTE — H&P
History & Physical    Name: Brenton Nicole MRN: [id number]  SSN: [id number]    YOB: 2000  Age: 22 y.o. Sex: female        Subjective:     Estimated Date of Delivery: 20  OB History        1    Para   0    Term                AB        Living   0       SAB        TAB        Ectopic        Molar        Multiple        Live Births                    Ms. Harvinder Crabtree is admitted with pregnancy at 39w6d for Presumptive ROM . Prenatal course was normal.Prenatal care has been followed by [facility]. Please see prenatal records for details. She had a large gush of fluid at 1700 yesterday and presented to L&D and had confirmed SROM. History reviewed. No pertinent past medical history. History reviewed. No pertinent surgical history. Social History     Occupational History    Not on file   Tobacco Use    Smoking status: Never Smoker    Smokeless tobacco: Never Used   Substance and Sexual Activity    Alcohol use: Not Currently    Drug use: Never    Sexual activity: Not Currently     Partners: Male     Family History   Problem Relation Age of Onset    Other Mother     Cancer Mother     High Cholesterol Mother     High Cholesterol Father        Allergies   Allergen Reactions    Sulfa (Sulfonamide Antibiotics) Hives     Prior to Admission medications    Medication Sig Start Date End Date Taking? Authorizing Provider   PNV#16-Iron Fum & PS-FA-OM-3 35-1-200 mg cap Take  by mouth. Yes Provider, Historical        Review of Systems: A comprehensive review of systems was negative except for that written in the HPI.     Objective:     Vitals:  Vitals:    20 0830 20 0845 20 0900 20 0915   BP: 112/76 116/77 116/72 113/76   Pulse: 85 91 78 88   Resp:       Temp:       SpO2:       Weight:       Height:            Physical Exam:  Heart: Regular rate and rhythm  Lung: clear to auscultation throughout lung fields, no wheezes, no rales, no rhonchi and normal respiratory effort  Fundus: soft and non tender  Perineum: blood absent, amniotic fluid present  Cervical Exam: 7 cm dilated    70% effaced    -2 station    Lower Extremities:  - Edema No  Membranes:  Spontaneous Rupture of Membranes; Amniotic Fluid: clear fluid  Fetal Heart Rate: Reactive    Prenatal Labs:   Lab Results   Component Value Date/Time    Rubella, External POSITIVE  07/24/2019    RPR, External  NON REATCIVE  07/24/2019         Assessment/Plan:     Plan: Admit for Reassuring fetal status, Labor  Progressing normally, Continue plan for vaginal delivery. Group B Strep was tested, but not resulted.     Signed By:  Kamlesh Webb MD     January 7, 2020

## 2020-01-07 NOTE — ANESTHESIA POSTPROCEDURE EVALUATION
* No procedures listed *.    epidural    Anesthesia Post Evaluation      Multimodal analgesia: multimodal analgesia not used between 6 hours prior to anesthesia start to PACU discharge  Patient location during evaluation: bedside  Patient participation: complete - patient participated  Level of consciousness: awake and alert  Pain management: satisfactory to patient  Airway patency: patent  Anesthetic complications: no  Cardiovascular status: acceptable  Respiratory status: acceptable  Hydration status: acceptable  Post anesthesia nausea and vomiting:  none      Vitals Value Taken Time   /76 1/7/2020  2:15 PM   Temp     Pulse 90 1/7/2020  2:15 PM   Resp     SpO2

## 2020-01-07 NOTE — PROGRESS NOTES
Assisted to the bathroom with her significant other, her right leg is still weak, able to void, pericare done by the pt, smll vaginal bleeding ,fundus is firm and well TWUSPJRNEA,3JS above umbilicus, epidural cath removed, tip intact. TRANSFER - OUT REPORT:    Verbal report given to KANG Dee RN(name) on [employer]  being transferred to mOTHER-bABY UNIT(unit) for routine progression of care       Report consisted of patients Situation, Background, Assessment and   Recommendations(SBAR). Information from the following report(s) SBAR, Kardex, OR Summary, Procedure Summary, Intake/Output, MAR, Recent Results and Med Rec Status was reviewed with the receiving nurse. Lines:   Peripheral IV 01/06/20 Anterior;Distal;Right Forearm (Active)   Site Assessment Clean, dry, & intact 1/7/2020  7:15 AM   Phlebitis Assessment 0 1/7/2020  7:15 AM   Infiltration Assessment 0 1/7/2020  7:15 AM   Dressing Status Clean, dry, & intact 1/7/2020  7:15 AM   Dressing Type Tape;Transparent 1/7/2020  7:15 AM   Hub Color/Line Status Green 1/7/2020  7:15 AM   Action Taken Open ports on tubing capped 1/6/2020  8:46 PM   Alcohol Cap Used Yes 1/6/2020  8:46 PM        Opportunity for questions and clarification was provided.       Patient transported with:   Registered Nurse   Julio Santamaria, RNC

## 2020-01-08 LAB
HCT VFR BLD AUTO: 33.1 % (ref 35–45)
HGB BLD-MCNC: 10.7 G/DL (ref 12–16)

## 2020-01-08 PROCEDURE — 65270000029 HC RM PRIVATE

## 2020-01-08 PROCEDURE — 36415 COLL VENOUS BLD VENIPUNCTURE: CPT

## 2020-01-08 PROCEDURE — 85018 HEMOGLOBIN: CPT

## 2020-01-08 PROCEDURE — 74011250637 HC RX REV CODE- 250/637: Performed by: OBSTETRICS & GYNECOLOGY

## 2020-01-08 PROCEDURE — 85014 HEMATOCRIT: CPT

## 2020-01-08 RX ADMIN — SENNOSIDES AND DOCUSATE SODIUM 1 TABLET: 8.6; 5 TABLET ORAL at 08:52

## 2020-01-08 RX ADMIN — IBUPROFEN 800 MG: 400 TABLET, FILM COATED ORAL at 23:41

## 2020-01-08 RX ADMIN — OXYCODONE HYDROCHLORIDE AND ACETAMINOPHEN 2 TABLET: 5; 325 TABLET ORAL at 08:51

## 2020-01-08 RX ADMIN — IBUPROFEN 800 MG: 400 TABLET, FILM COATED ORAL at 11:21

## 2020-01-08 RX ADMIN — ACETAMINOPHEN 650 MG: 325 TABLET, FILM COATED ORAL at 14:28

## 2020-01-08 RX ADMIN — IBUPROFEN 800 MG: 400 TABLET, FILM COATED ORAL at 16:18

## 2020-01-08 RX ADMIN — SENNOSIDES AND DOCUSATE SODIUM 1 TABLET: 8.6; 5 TABLET ORAL at 16:19

## 2020-01-08 RX ADMIN — ACETAMINOPHEN 650 MG: 325 TABLET, FILM COATED ORAL at 02:35

## 2020-01-08 RX ADMIN — ACETAMINOPHEN 650 MG: 325 TABLET, FILM COATED ORAL at 21:50

## 2020-01-08 NOTE — PROGRESS NOTES
Post-Partum Day Number 1  Progress Note    Patient doing well post-partum without significant complaints. Voiding without difficulty, normal lochia. Breast feeding well. Emotionally stable. Breastfeeding: Infant Feeding: Breast Milk and Formula  Vitals:    Patient Vitals for the past 8 hrs:   BP Temp Pulse Resp SpO2   20 0235 108/64 98.1 °F (36.7 °C) 84 16 99 %     Temp (24hrs), Av.1 °F (36.7 °C), Min:97.9 °F (36.6 °C), Max:98.6 °F (37 °C)      Vital signs stable, afebrile. Exam:  Patient is in good general condition. Emotionally: appears to be stable  Fundus:  Firm and not tender. Lower extremities are negative for swelling, cords or tenderness. Lab/Data Review:  CBC:   Lab Results   Component Value Date/Time    WBC 10.3 2020 08:02 PM    RBC 3.67 (L) 2020 08:02 PM    HGB 10.7 (L) 2020 05:35 AM    HCT 33.1 (L) 2020 05:35 AM    PLATELET 771  08:02 PM    Hgb, External 12.7 2019    Hct, External 37.8 2019    Platelet cnt., External 288 2019     Lab results reviewed. For significant abnormal values and values requiring intervention, see assessment and plan. Assessment:Post Partum Day number 1 PT doing well. Plan:    Continue routine perineal care and maternal education. Plan discharge tomorrow if no problems occur.     Education:  Post partum instructions discussed                Shiva Kirby MD  2020

## 2020-01-08 NOTE — PROGRESS NOTES
Visited with mother and acquired permission to announce baby's name.     Ivis Joiner   Spiritual Care   [phone]

## 2020-01-08 NOTE — PROGRESS NOTES
Verbal shift change report given to MAYELIN Sutton RN (oncoming nurse) by Zohreh Grimes RN (offgoing nurse). Report included the following information SBAR, Kardex, Intake/Output, MAR, Accordion, Recent Results and Med Rec Status.

## 2020-01-08 NOTE — LACTATION NOTE
Mother is doing a breast/formula combination. Discussed colostrum, it's importance and encouraged her to nurse first, then supplement if she feels it is necessary. Reviewed latch, diapers and milk coming in. Offered assistance if needed.

## 2020-01-08 NOTE — PROGRESS NOTES
Verbal shift change report given to Alfonzo Zaman RN (oncoming nurse) by Jodie Alonzo RN (offgoing nurse). Report included the following information SBAR, Kardex, Intake/Output, MAR, Accordion and Recent Results. Mother doing well, up without complaints, voiding without problems. Pain managed with motrin, tylenol, and percocet. Bonding well with baby.      Report given to Wero Gomez RN

## 2020-01-08 NOTE — PROGRESS NOTES
Problem: Vaginal Delivery: Postpartum Day 1  Goal: Activity/Safety  Outcome: Progressing Towards Goal  Goal: Consults, if ordered  Outcome: Progressing Towards Goal  Goal: Diagnostic Test/Procedures  Outcome: Progressing Towards Goal  Goal: Nutrition/Diet  Outcome: Progressing Towards Goal  Goal: Discharge Planning  Outcome: Progressing Towards Goal  Goal: Medications  Outcome: Progressing Towards Goal  Goal: Treatments/Interventions/Procedures  Outcome: Progressing Towards Goal  Goal: Psychosocial  Outcome: Progressing Towards Goal  Goal: *Vital signs within defined limits  Outcome: Progressing Towards Goal  Goal: *Labs within defined limits  Outcome: Progressing Towards Goal  Goal: *Hemodynamically stable  Outcome: Progressing Towards Goal  Goal: *Optimal pain control at patient's stated goal  Outcome: Progressing Towards Goal  Goal: *Participates in infant care  Outcome: Progressing Towards Goal  Goal: *Demonstrates progressive activity  Outcome: Progressing Towards Goal  Goal: *Performs self perineal care  Outcome: Progressing Towards Goal  Goal: *Appropriate parent-infant bonding  Outcome: Progressing Towards Goal  Goal: *Tolerating diet  Outcome: Progressing Towards Goal  Goal: *Performs self breast care  Outcome: Progressing Towards Goal

## 2020-01-09 VITALS
SYSTOLIC BLOOD PRESSURE: 110 MMHG | BODY MASS INDEX: 28.93 KG/M2 | TEMPERATURE: 96.8 F | OXYGEN SATURATION: 100 % | RESPIRATION RATE: 16 BRPM | WEIGHT: 180 LBS | DIASTOLIC BLOOD PRESSURE: 76 MMHG | HEIGHT: 66 IN | HEART RATE: 76 BPM

## 2020-01-09 LAB
BACTERIA SPEC CULT: NEGATIVE
SERVICE CMNT-IMP: NORMAL

## 2020-01-09 PROCEDURE — 74011250637 HC RX REV CODE- 250/637: Performed by: OBSTETRICS & GYNECOLOGY

## 2020-01-09 RX ORDER — IBUPROFEN 800 MG/1
800 TABLET ORAL
Qty: 30 TAB | Refills: 1 | Status: SHIPPED | OUTPATIENT
Start: 2020-01-09

## 2020-01-09 RX ADMIN — IBUPROFEN 800 MG: 400 TABLET, FILM COATED ORAL at 08:22

## 2020-01-09 RX ADMIN — SENNOSIDES AND DOCUSATE SODIUM 1 TABLET: 8.6; 5 TABLET ORAL at 08:22

## 2020-01-09 RX ADMIN — ACETAMINOPHEN 650 MG: 325 TABLET, FILM COATED ORAL at 04:19

## 2020-01-09 NOTE — ROUTINE PROCESS
Verbal shift change report given to Katelyn Freeman RN (oncoming nurse) by Blayne Ruby RN (offgoing nurse). Report included the following information SBAR, Procedure Summary, Intake/Output, MAR and Recent Results.

## 2020-01-09 NOTE — ROUTINE PROCESS
Verbal shift change report given to April Oliver, RN (oncoming nurse) by Claire Rosenbaum RN (offgoing nurse). Report included the following information SBAR, Procedure Summary, MAR and Recent Results. 0750 - rounded on pt. Assessment and vitals done; WNL. Pt anticipated discharge today and denies questions/concerns regarding plan of care. 1230 - discharge information reviewed with pt by this RN. Pt and partner present for discharge teaching. Both parents deny questions/concerns regarding care of infant and care of mother. [phone] - pt wheeled to lobby in wheelchair by unit secretary.

## 2020-01-09 NOTE — PROGRESS NOTES
Progress Note                               Patient: Dann Garcia MRN: [id number]  SSN: [id number]    YOB: 2000  Age: 22 y.o. Sex: female      Postpartum Day Number 2    Subjective:     Patient doing well postpartum without significant complaints. Voiding without difficulty. Patient reports normal lochia. . Breastfeeding: Yes     Objective:     Patient Vitals for the past 18 hrs:   Temp Pulse Resp BP SpO2   20 0750 96.8 °F (36 °C) 76 16 110/76 100 %   20 0413 97.9 °F (36.6 °C) 71 16 116/88 99 %   20 2130 97.8 °F (36.6 °C) 77 18 106/73 100 %   20 1619 97.7 °F (36.5 °C) 84 18 111/74 100 %        Temp (24hrs), Av.6 °F (36.4 °C), Min:96.8 °F (36 °C), Max:97.9 °F (36.6 °C)      Physical Exam:    Patient without distress. Lab/Data Review: All lab results for the last 24 hours reviewed. Assessment and Plan:     Patient appears to be having an uncomplicated postpartum course. Continue routine perineal care and maternal education. , Will discharge home today.     Yessenia Ortega MD  Pager: [phone]  Office: [phone]

## 2020-01-09 NOTE — DISCHARGE SUMMARY
Obstetrical Discharge Summary     Name: Hong Anglin MRN: [id number]  SSN: [id number]    YOB: 2000  Age: 22 y.o. Sex: female      Allergies: Sulfa (sulfonamide antibiotics)    Admit Date: 2020    Discharge Date: 2020     Admitting Physician: Neelima Salgado MD     Attending Physician:  Herminio Velasquez MD     * Admission Diagnoses: Abnormal pregnancy [O26.90]    * Discharge Diagnoses:   Information for the patient's : KaylynnBG Jacquelyn allen Bachelor [[id number]]   Delivery of a 7 lb 0.9 oz (3.2 kg) female infant via Vaginal, Spontaneous on 2020 at 11:54 AM  by Abelardo Valle. Apgars were 8  and 9 . Additional Diagnoses:   Hospital Problems as of 2020 Date Reviewed: 2020          Codes Class Noted - Resolved POA    Abnormal pregnancy ICD-10-CM: O26.90  ICD-9-CM: 646.90  2020 - Present Unknown             Lab Results   Component Value Date/Time    ABO/Rh(D) A POSITIVE 2020 08:02 PM    Rubella, External POSITIVE  2019      Immunization History   Administered Date(s) Administered    Influenza Vaccine (Quad) 2019    Tdap 2019       * Procedures:   * No surgery found *           * Discharge Condition: good    * Hospital Course: Normal hospital course following the delivery. * Disposition: Home    Discharge Medications:   Current Discharge Medication List          * Follow-up Care/Patient Instructions:   Activity: Activity as tolerated  Diet: Regular Diet  Wound Care: N/A    Follow-up Information     Follow up With Specialties Details Why Contact Info    Brando Sanderson MD Obstetrics & Gynecology, Gynecology, Obstetrics In 6 weeks postpartum visit [id number] [address] [place] [place] [address] [phone]

## 2020-02-18 ENCOUNTER — ROUTINE PRENATAL (OUTPATIENT)
Dept: OBGYN CLINIC | Age: 20
End: 2020-02-18

## 2020-02-18 VITALS
HEIGHT: 66 IN | DIASTOLIC BLOOD PRESSURE: 80 MMHG | RESPIRATION RATE: 20 BRPM | HEART RATE: 70 BPM | SYSTOLIC BLOOD PRESSURE: 112 MMHG | WEIGHT: 159 LBS | BODY MASS INDEX: 25.55 KG/M2 | OXYGEN SATURATION: 99 %

## 2020-02-18 DIAGNOSIS — Z30.09 FAMILY PLANNING: Primary | ICD-10-CM

## 2020-02-18 RX ORDER — NORETHINDRONE ACETATE AND ETHINYL ESTRADIOL 1MG-20(21)
1 KIT ORAL DAILY
Qty: 3 PACKAGE | Refills: 0 | Status: SHIPPED | OUTPATIENT
Start: 2020-02-18

## 2020-02-18 NOTE — LETTER
NOTIFICATION RETURN TO WORK / SCHOOL 
 
2/18/2020 11:40 AM 
 
Ms. Chi Farfan [address] [place] [phone] To Whom It May Concern: 
 
Chi Farfan is currently under the care of [facility]. She will return to work on:02/24/2020 with out restrictions. If there are questions or concerns please have the patient contact our office.  
 
 
 
Sincerely, 
 
 
Corry Gerber MD

## 2020-02-18 NOTE — PROGRESS NOTES
1. Have you been to the ER, urgent care clinic since your last visit? Hospitalized since your last visit? No    2. Have you seen or consulted any other health care providers outside of the [address] [place] [address] since your last visit? Include any pap smears or colon screening.  No     Visit Vitals  /80   Pulse 70   Resp 20   Ht 5' 6\" (1.676 m)   Wt 159 lb (72.1 kg)   SpO2 99%   BMI 25.66 kg/m²

## 2020-02-18 NOTE — PROGRESS NOTES
Depression Scale  In the past 7 days:  I have been able to laugh and see the funny side of things[de-identified] As much as I always could  I have looked forward with enjoyment to things: As much as I ever did  I have blamed myself unnecessarily when things went wrong: No, never  I have been anxious or worried for no good reason: No, not at all  I have felt scared or panicky for no good reason: No, not at all  Things have been getting on top of me: No, I have been coping as well as ever  I have been so unhappy that I have had difficulty sleeping: No, not at all  I have felt sad or miserable: No, not at all  I have been so unhappy that I have been crying: No, never  The thought of harming myself has occured to me: Never  [facility]  Depression Scale (EPDS)  [place]  Depression Score: 0

## 2020-02-18 NOTE — PROGRESS NOTES
Doing well, no concerns. Would like Mirena IUD for birth control. F/U 4-6 weeks for insertion. Malone scale negative. No problems from perineal lac.

## 2020-03-30 ENCOUNTER — TELEPHONE (OUTPATIENT)
Dept: OBGYN CLINIC | Age: 20
End: 2020-03-30

## 2020-07-21 ENCOUNTER — OFFICE VISIT (OUTPATIENT)
Dept: OBGYN CLINIC | Age: 20
End: 2020-07-21

## 2020-07-21 ENCOUNTER — HOSPITAL ENCOUNTER (OUTPATIENT)
Dept: LAB | Age: 20
Discharge: HOME | End: 2020-07-21
Payer: OTHER GOVERNMENT

## 2020-07-21 VITALS
WEIGHT: 182 LBS | HEART RATE: 78 BPM | RESPIRATION RATE: 18 BRPM | HEIGHT: 66 IN | SYSTOLIC BLOOD PRESSURE: 124 MMHG | TEMPERATURE: 98.5 F | DIASTOLIC BLOOD PRESSURE: 77 MMHG | BODY MASS INDEX: 29.25 KG/M2

## 2020-07-21 DIAGNOSIS — Z30.430 ENCOUNTER FOR IUD INSERTION: ICD-10-CM

## 2020-07-21 DIAGNOSIS — Z30.09 FAMILY PLANNING: Primary | ICD-10-CM

## 2020-07-21 LAB
HCG URINE, QL. (POC): NEGATIVE
VALID INTERNAL CONTROL?: YES

## 2020-07-21 PROCEDURE — 87798 DETECT AGENT NOS DNA AMP: CPT

## 2020-07-21 NOTE — PROGRESS NOTES
Pre Procedure Dx: Desires IUD insertion  Post Procedure Dx: same  Procedure: IUD insertion  Surgeon: Shona Varela  EBL: <2cc  Procedure: A time out was done, consents explaining risks, benefits, and alternatives to procedure verbalized including the risk of bleeding, infection, damage to surrounding organs/tissues, uterine perforation. Pt. Verbalized understanding. Consents were signed. A speculum was inserted into vaginal vault. The cervix was wiped off with three erickson swabs of betadyne. A single tooth tenaculum was used to grasp the anterior lip of the cervix. A sound was used to measure the cavity length at 8.5cm. The IUD was then inserted without difficulty. The strings were cute to a length visualized just outside of the external cervical os. The tenaculum was removed from the anterior lip of the cervix. Silver nitrate sticks were used to obtain perfect hemostasis at the tenaculum sites. The speculum was removed from the vaginal vault. The patient tolerated the procedure well. All sponge and lap counts correct times two. The patient was instructed to follow-up in one month for a string check.

## 2020-07-30 LAB
A VAGINAE DNA VAG QL NAA+PROBE: NORMAL SCORE
BVAB2 DNA VAG QL NAA+PROBE: NORMAL SCORE
C ALBICANS DNA VAG QL NAA+PROBE: NEGATIVE
C GLABRATA DNA VAG QL NAA+PROBE: NEGATIVE
C TRACH RRNA SPEC QL NAA+PROBE: NEGATIVE
MEGA1 DNA VAG QL NAA+PROBE: NORMAL SCORE
N GONORRHOEA RRNA SPEC QL NAA+PROBE: NEGATIVE
SPECIMEN SOURCE: NORMAL
T VAGINALIS RRNA SPEC QL NAA+PROBE: NEGATIVE

## 2020-08-27 ENCOUNTER — OFFICE VISIT (OUTPATIENT)
Dept: OBGYN CLINIC | Age: 20
End: 2020-08-27

## 2020-08-27 VITALS
TEMPERATURE: 99 F | RESPIRATION RATE: 20 BRPM | SYSTOLIC BLOOD PRESSURE: 110 MMHG | HEIGHT: 66 IN | DIASTOLIC BLOOD PRESSURE: 76 MMHG | BODY MASS INDEX: 30.15 KG/M2 | WEIGHT: 187.6 LBS | HEART RATE: 75 BPM | OXYGEN SATURATION: 99 %

## 2020-08-27 DIAGNOSIS — Z30.431 IUD CHECK UP: Primary | ICD-10-CM

## 2020-08-27 NOTE — PROGRESS NOTES
String check performed and strings visualized. F/U for annual exam. Practice phone number for continuity of care given to patient.

## 2020-08-27 NOTE — PROGRESS NOTES
1. Have you been to the ER, urgent care clinic since your last visit? Hospitalized since your last visit? No    2. Have you seen or consulted any other health care providers outside of the [address] [place] [address] since your last visit? Include any pap smears or colon screening.  No   Visit Vitals  /76   Pulse 75   Temp 99 °F (37.2 °C) (Temporal)   Resp 20   Ht 5' 6\" (1.676 m)   Wt 187 lb 9.6 oz (85.1 kg)   SpO2 99%   BMI 30.28 kg/m²